# Patient Record
Sex: MALE | Race: WHITE | Employment: FULL TIME | URBAN - METROPOLITAN AREA
[De-identification: names, ages, dates, MRNs, and addresses within clinical notes are randomized per-mention and may not be internally consistent; named-entity substitution may affect disease eponyms.]

---

## 2019-12-05 ENCOUNTER — THERAPY VISIT (OUTPATIENT)
Dept: PHYSICAL THERAPY | Facility: CLINIC | Age: 44
End: 2019-12-05
Payer: COMMERCIAL

## 2019-12-05 DIAGNOSIS — M25.561 RIGHT KNEE PAIN: ICD-10-CM

## 2019-12-05 PROCEDURE — 97140 MANUAL THERAPY 1/> REGIONS: CPT | Mod: GP | Performed by: PHYSICAL THERAPIST

## 2019-12-05 PROCEDURE — 97161 PT EVAL LOW COMPLEX 20 MIN: CPT | Mod: GP | Performed by: PHYSICAL THERAPIST

## 2019-12-05 PROCEDURE — 97110 THERAPEUTIC EXERCISES: CPT | Mod: GP | Performed by: PHYSICAL THERAPIST

## 2019-12-05 ASSESSMENT — ACTIVITIES OF DAILY LIVING (ADL)
AS_A_RESULT_OF_YOUR_KNEE_INJURY,_HOW_WOULD_YOU_RATE_YOUR_CURRENT_LEVEL_OF_DAILY_ACTIVITY?: SEVERELY ABNORMAL
KNEE_ACTIVITY_OF_DAILY_LIVING_SUM: 40
HOW_WOULD_YOU_RATE_THE_CURRENT_FUNCTION_OF_YOUR_KNEE_DURING_YOUR_USUAL_DAILY_ACTIVITIES_ON_A_SCALE_FROM_0_TO_100_WITH_100_BEING_YOUR_LEVEL_OF_KNEE_FUNCTION_PRIOR_TO_YOUR_INJURY_AND_0_BEING_THE_INABILITY_TO_PERFORM_ANY_OF_YOUR_USUAL_DAILY_ACTIVITIES?: 50
KNEEL ON THE FRONT OF YOUR KNEE: ACTIVITY IS VERY DIFFICULT
SQUAT: ACTIVITY IS SOMEWHAT DIFFICULT
HOW_WOULD_YOU_RATE_THE_OVERALL_FUNCTION_OF_YOUR_KNEE_DURING_YOUR_USUAL_DAILY_ACTIVITIES?: ABNORMAL
GO DOWN STAIRS: ACTIVITY IS SOMEWHAT DIFFICULT
RISE FROM A CHAIR: ACTIVITY IS MINIMALLY DIFFICULT
SWELLING: THE SYMPTOM AFFECTS MY ACTIVITY SLIGHTLY
GO UP STAIRS: ACTIVITY IS SOMEWHAT DIFFICULT
RAW_SCORE: 40
SIT WITH YOUR KNEE BENT: ACTIVITY IS MINIMALLY DIFFICULT
KNEE_ACTIVITY_OF_DAILY_LIVING_SCORE: 57.14
WEAKNESS: THE SYMPTOM AFFECTS MY ACTIVITY MODERATELY
LIMPING: THE SYMPTOM AFFECTS MY ACTIVITY MODERATELY
WALK: ACTIVITY IS MINIMALLY DIFFICULT
STIFFNESS: THE SYMPTOM AFFECTS MY ACTIVITY SLIGHTLY
GIVING WAY, BUCKLING OR SHIFTING OF KNEE: THE SYMPTOM AFFECTS MY ACTIVITY MODERATELY
PAIN: THE SYMPTOM AFFECTS MY ACTIVITY MODERATELY
STAND: ACTIVITY IS MINIMALLY DIFFICULT

## 2019-12-05 NOTE — PROGRESS NOTES
[facility] Initial Evaluation  Subjective:  The history is provided by the patient. No  was used.   Manolo Alcaraz being seen for R knee pain.   Date of Onset: 2 weeks ago. Where condition occurred: during recreation / sport.Problem occurred: While doing a spin kick in karate  and reported as 2/10 on pain scale. General health as reported by patient is good. Pertinent medical history includes:  Overweight. Other health conditions: None.    Surgeries include:  None.  Current medications:  Anti-inflammatory.   Primary job tasks include:  Computer work, prolonged sitting and repetitive tasks.  Pain is described as sharp and is intermittent. Pain is the same all the time. Since onset symptoms are gradually improving. Special tests:  X-ray and MRI.     Patient is [employer]. Restrictions include:  Working in normal job without restrictions.    Barriers include:  None as reported by patient.  Red flags:  None as reported by patient.  Type of problem:  Right knee       Problem details: Patient reports onset of R knee pain first in 5/2019 the day after sparing in karate.  Had an X-ray and was told there was a bone spur.  Symptoms improved but did not fully resolve.  Reports an exacerbation of R knee symptoms 2 weeks ago after doing a spin kick in karate.  Patient heard/felt a crack and experienced pain.  Patient initially had difficulty walking and moving the knee through range of motion.  Received PT orders 6/7/2019..   Patient reports pain:  Anterior and lateral.  Associated symptoms:  Edema and loss of motion/stiffness. Symptoms are exacerbated by other, descending stairs and ascending stairs (twisting, pushing something sideways with R foot) and relieved by rest.                      Objective:  System                                                Knee Evaluation:  ROM:    AROM    Hyperextension:  Left:  0    Right: 10  Extension:  Left: 148    Right:  135          Strength:      Extension:  Left: 5/5    Pain:-      Right: 5/5    Pain:-  Flexion:  Left: 5/5    Pain:-      Right: 4/5    Pain:-    Quad Set Left:  Good    Pain: -   Quad Set Right:  Fair    Pain: +    Special Tests:     Right knee positive for the following tests:  Patellar Compression  Palpation:  Normal                General     ROS    Assessment/Plan:    Patient is a 44 year old male with right side knee complaints.    Patient has the following significant findings with corresponding treatment plan.                Diagnosis 1:  R knee pain  Pain -  manual therapy and home program  Decreased ROM/flexibility - manual therapy, therapeutic exercise and home program  Decreased strength - therapeutic exercise, therapeutic activities and home program  Edema - self management/home program and manual therapy  Decreased function - therapeutic activities and home program      Previous and current functional limitations:  (See Goal Flow Sheet for this information)    Short term and Long term goals: (See Goal Flow Sheet for this information)     Communication ability:  Patient appears to be able to clearly communicate and understand verbal and written communication and follow directions correctly.  Treatment Explanation - The following has been discussed with the patient:   RX ordered/plan of care  Anticipated outcomes  Possible risks and side effects  This patient would benefit from PT intervention to resume normal activities.   Rehab potential is good.    Frequency:  1 X week, once daily  Duration:  for 8 weeks  Discharge Plan:  Achieve all LTG.  Independent in home treatment program.  Reach maximal therapeutic benefit.    Please refer to the daily flowsheet for treatment today, total treatment time and time spent performing 1:1 timed codes.

## 2019-12-05 NOTE — LETTER
[facility] PHYSICAL THERAPY  [address]  [facility] [address]  [phone]    2019    Re: Manolo Alcaraz   :   1975  MRN:  [id number]   REFERRING PHYSICIAN:   Juan C Campos    [facility] PHYSICAL THERAPY  Date of Initial Evaluation:  19  Visits:  Rxs Used: 1  Reason for Referral:  Right knee pain    EVALUATION SUMMARY    [facility] Initial Evaluation    Subjective:  The history is provided by the patient. No  was used.   Manolo Alcaraz being seen for R knee pain.     Date of Onset: 2 weeks ago. Where condition occurred: during recreation / sport.Problem occurred: While doing a spin kick in karate  and reported as 2/10 on pain scale. General health as reported by patient is good.     Pertinent medical history includes: Overweight. Other health conditions: None.    Surgeries include: None. Current medications: Anti-inflammatory. Primary job tasks include: Computer work, prolonged sitting and repetitive tasks.  Pain is described as sharp and is intermittent. Pain is the same all the time. Since onset symptoms are gradually improving. Special tests:  X-ray and MRI. Patient is [employer]. Restrictions include:  Working in normal job without restrictions.    Barriers include:  None as reported by patient.  Red flags:  None as reported by patient.  Type of problem:  Right knee    Problem details: Patient reports onset of R knee pain first in 2019 the day after sparing in karate.  Had an X-ray and was told there was a bone spur. Symptoms improved but did not fully resolve.  Reports an exacerbation of R knee symptoms 2 weeks ago after doing a spin kick in karate. Patient heard/felt a crack and experienced pain.  Patient initially had difficulty walking and moving the knee through range of motion.  Received PT orders 2019.       Patient reports pain:  Anterior and lateral.  Associated symptoms:   Edema and loss of motion/stiffness. Symptoms are exacerbated by other, descending stairs and ascending stairs (twisting, pushing something sideways with R foot) and relieved by rest.          Objective:       Knee Evaluation:  ROM:    AROM    Hyperextension:  Left:  0    Right: 10  Extension:  Left: 148    Right:  135    Strength:   Extension:  Left: 5/5    Pain:-      Right: 5/5    Pain:-  Flexion:  Left: 5/5    Pain:-      Right: 4/5    Pain:-    Quad Set Left:  Good    Pain: -   Quad Set Right:  Fair    Pain: +    Special Tests:   Right knee positive for the following tests:  Patellar Compression    Palpation:  Normal    Assessment/Plan:    Patient is a 44 year old male with right side knee complaints.    Patient has the following significant findings with corresponding treatment plan.                  Diagnosis 1:  R knee pain  Pain -  manual therapy and home program  Decreased ROM/flexibility - manual therapy, therapeutic exercise and home program  Decreased strength - therapeutic exercise, therapeutic activities and home program  Edema - self management/home program and manual therapy  Decreased function - therapeutic activities and home program      Previous and current functional limitations:  (See Goal Flow Sheet for this information)    Short term and Long term goals: (See Goal Flow Sheet for this information)     Communication ability:  Patient appears to be able to clearly communicate and understand verbal and written communication and follow directions correctly.    Treatment Explanation - The following has been discussed with the patient:   RX ordered/plan of care  Anticipated outcomes  Possible risks and side effects    This patient would benefit from PT intervention to resume normal activities.   Rehab potential is good.  Frequency:  1 X week, once daily  Duration:  for 8 weeks  Discharge Plan:  Achieve all LTG.  Independent in home treatment program.  Reach maximal therapeutic benefit.    Thank you  for your referral.        INQUIRIES  Therapist: Kristin Jang PT  [facility] PHYSICAL THERAPY  [address]  [facility] [address]  Phone: [phone]  Fax: [phone]

## 2019-12-12 ENCOUNTER — THERAPY VISIT (OUTPATIENT)
Dept: PHYSICAL THERAPY | Facility: CLINIC | Age: 44
End: 2019-12-12
Payer: COMMERCIAL

## 2019-12-12 DIAGNOSIS — M25.561 RIGHT KNEE PAIN: ICD-10-CM

## 2019-12-12 PROCEDURE — 97110 THERAPEUTIC EXERCISES: CPT | Mod: GP | Performed by: PHYSICAL THERAPIST

## 2019-12-12 PROCEDURE — 97140 MANUAL THERAPY 1/> REGIONS: CPT | Mod: GP | Performed by: PHYSICAL THERAPIST

## 2019-12-26 ENCOUNTER — THERAPY VISIT (OUTPATIENT)
Dept: PHYSICAL THERAPY | Facility: CLINIC | Age: 44
End: 2019-12-26
Payer: COMMERCIAL

## 2019-12-26 DIAGNOSIS — M25.561 RIGHT KNEE PAIN: ICD-10-CM

## 2019-12-26 PROCEDURE — 97140 MANUAL THERAPY 1/> REGIONS: CPT | Mod: GP | Performed by: PHYSICAL THERAPIST

## 2019-12-26 PROCEDURE — 97110 THERAPEUTIC EXERCISES: CPT | Mod: GP | Performed by: PHYSICAL THERAPIST

## 2020-01-02 ENCOUNTER — THERAPY VISIT (OUTPATIENT)
Dept: PHYSICAL THERAPY | Facility: CLINIC | Age: 45
End: 2020-01-02
Payer: COMMERCIAL

## 2020-01-02 DIAGNOSIS — M25.561 RIGHT KNEE PAIN: ICD-10-CM

## 2020-01-02 PROCEDURE — 97110 THERAPEUTIC EXERCISES: CPT | Mod: GP | Performed by: PHYSICAL THERAPIST

## 2020-01-02 PROCEDURE — 97140 MANUAL THERAPY 1/> REGIONS: CPT | Mod: GP | Performed by: PHYSICAL THERAPIST

## 2020-01-09 ENCOUNTER — THERAPY VISIT (OUTPATIENT)
Dept: PHYSICAL THERAPY | Facility: CLINIC | Age: 45
End: 2020-01-09
Payer: COMMERCIAL

## 2020-01-09 DIAGNOSIS — M25.561 RIGHT KNEE PAIN: ICD-10-CM

## 2020-01-09 PROCEDURE — 97110 THERAPEUTIC EXERCISES: CPT | Mod: GP | Performed by: PHYSICAL THERAPIST

## 2020-01-09 PROCEDURE — 97140 MANUAL THERAPY 1/> REGIONS: CPT | Mod: GP | Performed by: PHYSICAL THERAPIST

## 2020-03-09 PROBLEM — M25.561 RIGHT KNEE PAIN: Status: RESOLVED | Noted: 2019-12-05 | Resolved: 2020-03-09

## 2020-03-09 NOTE — PROGRESS NOTES
Discharge Note    Progress reporting period is from initial evaluation date (please see noted date below) to Jan 9, 2020.  Linked Episodes   Type: Episode: Status: Noted: Resolved: Last update: Updated by:   PHYSICAL THERAPY R knee pain 12/5/2019 Active 12/5/2019 1/9/2020 11:45 AM Kristin Jang, PT      Comments:       Manolo failed to follow up and current status is unknown.  Please see information below for last relevant information on current status.  Patient seen for 5 visits.    SUBJECTIVE  Subjective changes noted by patient:  Patient reports the knee has been pretty good again this week.  Stairs are going easier.  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  Was on feet and walking around for a long time and demonstrated a few things.  Did not do anythng involving twisting/pivoting.  This morning the knee felt fat when walking down the stairs, but this has resolved.  Currently 1/10 discomfort along the lateral knee.  .  Current pain level is 1/10.     Previous pain level was   .   Changes in function:  Yes (See Goal flowsheet attached for changes in current functional level)  Adverse reaction to treatment or activity: None    OBJECTIVE  Changes noted in objective findings: AROM R knee 2-144 degrees.     ASSESSMENT/PLAN  Diagnosis: R knee pain   Updated problem list and treatment plan:   Pain - HEP  Decreased ROM/flexibility - HEP  Decreased function - HEP  Decreased strength - HEP  STG/LTGs have been met or progress has been made towards goals:  Yes, please see goal flowsheet for most current information  Assessment of Progress: current status is unknown.    Last current status: Pt is progressing as expected   Self Management Plans:  HEP  I have re-evaluated this patient and find that the nature, scope, duration and intensity of the therapy is appropriate for the medical condition of the patient.  Manolo continues to require the following intervention to meet STG and LTG's:   HEP.    Recommendations:  Discharge with current home program.  Patient to follow up with MD as needed.    Please refer to the daily flowsheet for treatment today, total treatment time and time spent performing 1:1 timed codes.